# Patient Record
Sex: FEMALE | Race: WHITE | NOT HISPANIC OR LATINO | Employment: OTHER | URBAN - METROPOLITAN AREA
[De-identification: names, ages, dates, MRNs, and addresses within clinical notes are randomized per-mention and may not be internally consistent; named-entity substitution may affect disease eponyms.]

---

## 2022-05-30 ENCOUNTER — HOSPITAL ENCOUNTER (EMERGENCY)
Facility: HOSPITAL | Age: 72
Discharge: HOME | End: 2022-05-30
Attending: EMERGENCY MEDICINE
Payer: MEDICARE

## 2022-05-30 VITALS
WEIGHT: 186.94 LBS | OXYGEN SATURATION: 98 % | DIASTOLIC BLOOD PRESSURE: 79 MMHG | TEMPERATURE: 98 F | RESPIRATION RATE: 16 BRPM | SYSTOLIC BLOOD PRESSURE: 175 MMHG | HEART RATE: 72 BPM

## 2022-05-30 DIAGNOSIS — S00.03XA CONTUSION OF SCALP, INITIAL ENCOUNTER: ICD-10-CM

## 2022-05-30 DIAGNOSIS — S62.102A CLOSED FRACTURE OF LEFT WRIST, INITIAL ENCOUNTER: Primary | ICD-10-CM

## 2022-05-30 DIAGNOSIS — M25.539 WRIST PAIN: ICD-10-CM

## 2022-05-30 PROCEDURE — 99283 EMERGENCY DEPT VISIT LOW MDM: CPT | Mod: 25

## 2022-05-30 PROCEDURE — 29125 APPL SHORT ARM SPLINT STATIC: CPT | Mod: LT

## 2022-05-30 RX ORDER — OMEPRAZOLE 40 MG/1
40 CAPSULE, DELAYED RELEASE ORAL DAILY PRN
COMMUNITY
Start: 2022-04-13

## 2022-05-30 RX ORDER — ROSUVASTATIN CALCIUM 10 MG/1
10 TABLET, COATED ORAL NIGHTLY
COMMUNITY
Start: 2022-03-13

## 2022-05-30 RX ORDER — FLUTICASONE PROPIONATE 50 MCG
1 SPRAY, SUSPENSION (ML) NASAL DAILY
COMMUNITY
Start: 2022-04-04

## 2022-05-30 RX ORDER — HYDROCODONE BITARTRATE AND ACETAMINOPHEN 5; 325 MG/1; MG/1
1 TABLET ORAL EVERY 6 HOURS PRN
Qty: 12 TABLET | Refills: 0 | Status: SHIPPED | OUTPATIENT
Start: 2022-05-30

## 2022-05-30 RX ORDER — VIT C/E/ZN/COPPR/LUTEIN/ZEAXAN 250MG-90MG
1000 CAPSULE ORAL
COMMUNITY

## 2022-05-30 NOTE — ED NOTES
RN assisted tech with splint placement. Pt. Tolerated well. PMS intact before and after splint applied. Education provided regarding splint.

## 2022-05-30 NOTE — ED PROVIDER NOTES
Encounter Date: 5/30/2022       History     Chief Complaint   Patient presents with    Fall     Patient states she tripped and fell this morning, denies LOC, L wrist pain and hematoma to L forehead     71-year-old female with complaint of left wrist pain since this morning.  Patient reports she tripped while walking outside and landed on outstretched arm and injured left wrist.  Patient also reports she bumped her head on the ground but did not lose consciousness.  Patient reports left wrist pain only.  Pain worse with any movement.  No neck pain no hip pain no chest pain.  No nausea or headache.        Review of patient's allergies indicates:  No Known Allergies  Past Medical History:   Diagnosis Date    Hypertension      History reviewed. No pertinent surgical history.  History reviewed. No pertinent family history.     Review of Systems   Constitutional: Negative for fever.   HENT: Negative for sore throat.    Respiratory: Negative for shortness of breath.    Cardiovascular: Negative for chest pain.   Gastrointestinal: Negative for nausea.   Genitourinary: Negative for dysuria.   Musculoskeletal: Negative for back pain.        Left wrist pain    Skin: Negative for rash.   Neurological: Negative for weakness.   Hematological: Does not bruise/bleed easily.       Physical Exam     Initial Vitals [05/30/22 0836]   BP Pulse Resp Temp SpO2   (!) 175/79 75 18 97.8 °F (36.6 °C) 98 %      MAP       --         Physical Exam    Nursing note and vitals reviewed.  Constitutional: She appears well-developed and well-nourished.   HENT:   Head: Normocephalic.   Small left frontal scalp hematoma   Eyes: Conjunctivae and EOM are normal. Pupils are equal, round, and reactive to light.   Neck: Neck supple.   Normal range of motion.  Cardiovascular: Normal rate, regular rhythm, normal heart sounds and intact distal pulses.   Pulmonary/Chest: Breath sounds normal.   Abdominal: Abdomen is soft. There is no abdominal tenderness. There  is no rebound and no guarding.   Musculoskeletal:      Cervical back: Normal range of motion and neck supple.      Comments: Tenderness and swelling distal left radius, no hand tenderness, no elbow tenderness, no spine tenderness, no pelvic or hip tenderness     Neurological: She is alert and oriented to person, place, and time. She has normal strength and normal reflexes.   Skin: Skin is warm and dry.   Psychiatric: She has a normal mood and affect. Her behavior is normal. Thought content normal.         ED Course   Procedures  Labs Reviewed - No data to display   Procedure:  Sugar tong ocl splint applied to left wrist: alignment good, neurovascular status intact    Imaging Results          X-Ray Wrist Complete Left (Final result)  Result time 05/30/22 09:23:28    Final result by Sacha Petty MD (05/30/22 09:23:28)                 Impression:      1.  Comminuted and compressed distal radius fracture with joint surface involvement.    2.  Probable triquetral avulsion fracture along the dorsum of the carpal bones with overlying soft tissue swelling.    3.  Negative for acute process otherwise.    4.  Marked degenerative changes of the 1st carpometacarpal joint noted.      Electronically signed by: Sacha Petty MD  Date:    05/30/2022  Time:    09:23             Narrative:    EXAMINATION:  XR WRIST COMPLETE 3 VIEWS LEFT    CLINICAL HISTORY:  Pain in unspecified wrist    TECHNIQUE:  PA, lateral, and oblique views of the left wrist were performed.    COMPARISON:  None    FINDINGS:  There is a comminuted and compressed distal radius fracture with joint surface involvement.  Soft tissue calcification along the dorsal carpal bones on the lateral view with overlying soft tissue swelling is concerning for a triquetral avulsion fracture.    No other fractures are seen.  Marked degenerative changes of the 1st carpometacarpal joint with surrounding soft tissue calcifications noted.                                  Medications - No data to display                       Clinical Impression:   Final diagnoses:  [M25.539] Wrist pain  [S62.102A] Closed fracture of left wrist, initial encounter (Primary)  [S00.03XA] Contusion of scalp, initial encounter          ED Disposition Condition    Discharge Stable        ED Prescriptions     Medication Sig Dispense Start Date End Date Auth. Provider    HYDROcodone-acetaminophen (NORCO) 5-325 mg per tablet Take 1 tablet by mouth every 6 (six) hours as needed for Pain. 12 tablet 5/30/2022  Lit Salinas NP        Follow-up Information     Follow up With Specialties Details Why Contact Info    [facility]  Schedule an appointment as soon as possible for a visit in 2 days  [address] [place] [address] [address]  [place] [address]  [phone]             Lit Salinas NP  05/30/22 0994

## 2022-06-10 ENCOUNTER — TELEPHONE (OUTPATIENT)
Dept: ORTHOPEDICS | Facility: CLINIC | Age: 72
End: 2022-06-10
Payer: MEDICARE

## 2022-06-10 NOTE — TELEPHONE ENCOUNTER
----- Message from Ana Cristina Mehta MA sent at 6/10/2022  9:16 AM CDT -----  Hello, This is an ED follow up. Please contact to schedule.

## 2022-06-13 ENCOUNTER — TELEPHONE (OUTPATIENT)
Dept: ORTHOPEDICS | Facility: CLINIC | Age: 72
End: 2022-06-13
Payer: MEDICARE

## 2022-06-13 NOTE — TELEPHONE ENCOUNTER
----- Message from Kim Carrasco LPN sent at 6/13/2022 10:06 AM CDT -----  Contact: self    ----- Message -----  From: Joe Schwartz  Sent: 6/10/2022   4:44 PM CDT  To: [facility] Clinical Staff    Type:  Patient Returning Call    Who Called:Otilio Bourne   Who Left Message for Patient: not sure   Does the patient know what this is regarding?: appointment   Would the patient rather a call back or a response via [facility]?  Call back   Best Call Back Number: [phone]   Additional Information:

## 2022-06-13 NOTE — TELEPHONE ENCOUNTER
Spoke with patient and she has seen a physician at [facility]. [facility] is out of network.

## 2022-06-15 ENCOUNTER — TELEPHONE (OUTPATIENT)
Dept: ORTHOPEDICS | Facility: CLINIC | Age: 72
End: 2022-06-15
Payer: MEDICARE

## 2022-06-15 NOTE — TELEPHONE ENCOUNTER
Spoke with patient and she has seen a physician at [facility]. [facility] is out of network.

## 2022-06-15 NOTE — TELEPHONE ENCOUNTER
----- Message from Agus Valera PA-C sent at 6/15/2022 11:50 AM CDT -----  Contact: self  She can come tomorrow to see me  ----- Message -----  From: Marely Green MA  Sent: 6/13/2022   5:25 PM CDT  To: Agus Valera PA-C    When should patient follow up in clinic?   Please Advise   ----- Message -----  From: Kim Carrasco LPN  Sent: 6/13/2022  10:07 AM CDT  To: Marely Green MA      ----- Message -----  From: Joe Schwartz  Sent: 6/10/2022   4:44 PM CDT  To: [facility] Clinical Staff    Type:  Patient Returning Call    Who Called:Otilio Bourne   Who Left Message for Patient: not sure   Does the patient know what this is regarding?: appointment   Would the patient rather a call back or a response via [facility]?  Call back   Best Call Back Number: [phone]   Additional Information: